# Patient Record
(demographics unavailable — no encounter records)

---

## 2024-11-12 NOTE — PLAN
[FreeTextEntry1] : DM2 - check CMP and A1c. Continue Metformin and Actos. Consider GLP-1 pending A1c result. HTN - BP controlled. Continue Lisinopril and Metoprolol.  HLD - check fasting lipids and chemistries. Continue Simvastatin. Obesity - discussed diet / exercise and weight loss efforts. We will consider GLP-1. BPH / prostate cancer - continue Flomax. Repeat PSA. Recent imaging study negative. Followed with urology.  RTO 6 months for physical.

## 2024-11-12 NOTE — HEALTH RISK ASSESSMENT
[0] : 2) Feeling down, depressed, or hopeless: Not at all (0) [PHQ-2 Negative - No further assessment needed] : PHQ-2 Negative - No further assessment needed [Former] : Former [20 or more] : 20 or more [> 15 Years] : > 15 Years [[id number]] : 0

## 2024-11-12 NOTE — HISTORY OF PRESENT ILLNESS
[FreeTextEntry1] : HTN, HLD, DM2 [de-identified] : Patient comes for 6 month follow up visit and fasting labs.  He reports feeling well. His cardiologist recently increased the Metoprolol from 50 to 75 mg. He had his pacemaker checked recently. He saw urologist, PSA slightly higher than baseline. Had imaging and no evidence of prostate cancer. He recently saw ENT and had cerumen removed.

## 2024-11-12 NOTE — HEALTH RISK ASSESSMENT
[0] : 2) Feeling down, depressed, or hopeless: Not at all (0) [PHQ-2 Negative - No further assessment needed] : PHQ-2 Negative - No further assessment needed [Former] : Former [20 or more] : 20 or more [> 15 Years] : > 15 Years [[id number]] : 0

## 2024-11-12 NOTE — PHYSICAL EXAM
[No Acute Distress] : no acute distress [Well Nourished] : well nourished [Well Developed] : well developed [Well-Appearing] : well-appearing [No Lymphadenopathy] : no lymphadenopathy [Supple] : supple [Thyroid Normal, No Nodules] : the thyroid was normal and there were no nodules present [No Respiratory Distress] : no respiratory distress  [No Accessory Muscle Use] : no accessory muscle use [Clear to Auscultation] : lungs were clear to auscultation bilaterally [Normal Rate] : normal rate  [Regular Rhythm] : with a regular rhythm [Normal S1, S2] : normal S1 and S2 [No Murmur] : no murmur heard [No Varicosities] : no varicosities [Pedal Pulses Present] : the pedal pulses are present [No Edema] : there was no peripheral edema [Non Tender] : non-tender [Soft] : abdomen soft [Non-distended] : non-distended [Normal Bowel Sounds] : normal bowel sounds [No Rash] : no rash [Coordination Grossly Intact] : coordination grossly intact [Normal Gait] : normal gait [Deep Tendon Reflexes (DTR)] : deep tendon reflexes were 2+ and symmetric [Normal Affect] : the affect was normal [Normal Mood] : the mood was normal [de-identified] : friendly [de-identified] : left ppm [de-identified] : obese abdomen

## 2024-11-12 NOTE — PHYSICAL EXAM
[No Acute Distress] : no acute distress [Well Nourished] : well nourished [Well Developed] : well developed [Well-Appearing] : well-appearing [No Lymphadenopathy] : no lymphadenopathy [Supple] : supple [Thyroid Normal, No Nodules] : the thyroid was normal and there were no nodules present [No Respiratory Distress] : no respiratory distress  [No Accessory Muscle Use] : no accessory muscle use [Clear to Auscultation] : lungs were clear to auscultation bilaterally [Normal Rate] : normal rate  [Regular Rhythm] : with a regular rhythm [Normal S1, S2] : normal S1 and S2 [No Murmur] : no murmur heard [No Varicosities] : no varicosities [Pedal Pulses Present] : the pedal pulses are present [No Edema] : there was no peripheral edema [Non Tender] : non-tender [Soft] : abdomen soft [Non-distended] : non-distended [Normal Bowel Sounds] : normal bowel sounds [No Rash] : no rash [Coordination Grossly Intact] : coordination grossly intact [Normal Gait] : normal gait [Deep Tendon Reflexes (DTR)] : deep tendon reflexes were 2+ and symmetric [Normal Affect] : the affect was normal [Normal Mood] : the mood was normal [de-identified] : friendly [de-identified] : left ppm [de-identified] : obese abdomen

## 2024-11-12 NOTE — HISTORY OF PRESENT ILLNESS
[FreeTextEntry1] : HTN, HLD, DM2 [de-identified] : Patient comes for 6 month follow up visit and fasting labs.  He reports feeling well. His cardiologist recently increased the Metoprolol from 50 to 75 mg. He had his pacemaker checked recently. He saw urologist, PSA slightly higher than baseline. Had imaging and no evidence of prostate cancer. He recently saw ENT and had cerumen removed.

## 2025-05-15 NOTE — HISTORY OF PRESENT ILLNESS
[de-identified] : Patient comes for an annual exam.  He reports feeling well. He does report occasional b/l hand numbness / tingling since last Summer. No prior trauma. He is curious of treatment options.  He is compliant with medications. Jardiance was added by cardiology in 12/24. He declined starting GLP-1 for DM.

## 2025-05-15 NOTE — REVIEW OF SYSTEMS
[Recent Change In Weight] : ~T recent weight change [Hearing Loss] : hearing loss [Negative] : Heme/Lymph [FreeTextEntry2] : 2 lb loss  [FreeTextEntry9] : occasional back pain [de-identified] : occasional hand paresthesia x 1 yr

## 2025-05-15 NOTE — HEALTH RISK ASSESSMENT
[Excellent] : ~his/her~  mood as  excellent [No] : In the past 12 months have you used drugs other than those required for medical reasons? No [No falls in past year] : Patient reported no falls in the past year [0] : 2) Feeling down, depressed, or hopeless: Not at all (0) [PHQ-2 Negative - No further assessment needed] : PHQ-2 Negative - No further assessment needed [Former] : Former [20 or more] : 20 or more [> 15 Years] : > 15 Years [Patient reported colonoscopy was normal] : Patient reported colonoscopy was normal [Alone] : lives alone [Retired] : retired [] :  [# Of Children ___] : has [unfilled] children [Fully functional (bathing, dressing, toileting, transferring, walking, feeding)] : Fully functional (bathing, dressing, toileting, transferring, walking, feeding) [Fully functional (using the telephone, shopping, preparing meals, housekeeping, doing laundry, using] : Fully functional and needs no help or supervision to perform IADLs (using the telephone, shopping, preparing meals, housekeeping, doing laundry, using transportation, managing medications and managing finances) [Smoke Detector] : smoke detector [Carbon Monoxide Detector] : carbon monoxide detector [Seat Belt] :  uses seat belt [Sunscreen] : uses sunscreen [Little interest or pleasure doing things] : 1) Little interest or pleasure doing things [Feeling down, depressed, or hopeless] : 2) Feeling down, depressed, or hopeless [Monthly or less (1 pt)] : Monthly or less (1 point) [1 or 2 (0 pts)] : 1 or 2 (0 points) [Never (0 pts)] : Never (0 points) [None] : Patient does not have any barriers to medication adherence [Yes] : Reviewed medication list for presence of high-risk medications. [Benzodiazepines] : benzodiazepines [Opioids] : opioids [Blood Thinners] : blood thinners [Muscle Relaxants] : muscle relaxants [Sedatives] : sedatives [With Patient/Caregiver] : , with patient/caregiver [Reviewed no changes] : Reviewed, no changes [Designated Healthcare Proxy] : Designated healthcare proxy [Name: ___] : Health Care Proxy's Name: [unfilled]  [Relationship: ___] : Relationship: [unfilled] [Aggressive treatment] : aggressive treatment [I will adhere to the patient's wishes.] : I will adhere to the patient's wishes. [Audit-CScore] : 1 [[id number]] : 0 [Sexually Active] : not sexually active [High Risk Behavior] : no high risk behavior [Reports changes in hearing] : Reports no changes in hearing [Reports changes in vision] : Reports no changes in vision [Reports changes in dental health] : Reports no changes in dental health [ColonoscopyDate] : 05/16 [ColonoscopyComments] : Dr. Kaba  [AdvancecareDate] : 05/25

## 2025-05-15 NOTE — PHYSICAL EXAM
[No Acute Distress] : no acute distress [Well Nourished] : well nourished [Well Developed] : well developed [Well-Appearing] : well-appearing [Normal] : normal sclera/conjunctiva, pupils are equal, round and reactive to light and extraocular movements are intact [Normal Oropharynx] : the oropharynx was normal [No Lymphadenopathy] : no lymphadenopathy [Supple] : supple [Thyroid Normal, No Nodules] : the thyroid was normal and there were no nodules present [No Respiratory Distress] : no respiratory distress  [No Accessory Muscle Use] : no accessory muscle use [Clear to Auscultation] : lungs were clear to auscultation bilaterally [Normal Rate] : normal rate  [Regular Rhythm] : with a regular rhythm [Normal S1, S2] : normal S1 and S2 [No Murmur] : no murmur heard [No Varicosities] : no varicosities [Pedal Pulses Present] : the pedal pulses are present [No Edema] : there was no peripheral edema [Soft] : abdomen soft [Non Tender] : non-tender [Non-distended] : non-distended [Normal Bowel Sounds] : normal bowel sounds [Normal Posterior Cervical Nodes] : no posterior cervical lymphadenopathy [Normal Anterior Cervical Nodes] : no anterior cervical lymphadenopathy [No CVA Tenderness] : no CVA  tenderness [No Spinal Tenderness] : no spinal tenderness [No Joint Swelling] : no joint swelling [Grossly Normal Strength/Tone] : grossly normal strength/tone [No Rash] : no rash [Coordination Grossly Intact] : coordination grossly intact [Normal Gait] : normal gait [Deep Tendon Reflexes (DTR)] : deep tendon reflexes were 2+ and symmetric [Normal Affect] : the affect was normal [Alert and Oriented x3] : oriented to person, place, and time [Normal Mood] : the mood was normal [Normal Insight/Judgement] : insight and judgment were intact [de-identified] : friendly [de-identified] : right scant cerumen [de-identified] : left upper ppm [de-identified] : obese abdomen

## 2025-05-15 NOTE — PLAN
[FreeTextEntry1] : Check routine fasting labs. Discussed diet, exercise, and weight loss efforts. Vaccines reviewed. Flu shot in Fall. Recommend Shingrix. No further colonoscopy screening given age. DM2 - check A1c. Continue Jardiance, Metformin and Actos. He does not prefer GLP-1.  HTN - BP controlled. Continue Lisinopril and Metoprolol. Followed with cardiology. HLD - check fasting lipids and chemistries. Continue Simvastatin. Obesity - discussed diet / exercise and weight loss efforts. BPH / prostate cancer - continue Flomax. Check PSA. Followed with urologist. LEWIS on CPAP - follow up with pulmonary. Check B12 and vitamin D levels.  Mild anemia - check CBC. Hand numbness - check labs w/ B12. Consider trial of Gabapentin and hand ortho consult.  RTO 6 months fasting.